# Patient Record
(demographics unavailable — no encounter records)

---

## 2025-02-10 NOTE — DISCUSSION/SUMMARY
[de-identified] : (Impression) -left ankle achilles tendonitis, left foot plantar fascitis  The diagnosis was explained in detail. The potential non-surgical and surgical treatments were reviewed. The relative risks and benefits of each option were considered relative to the patient age, activity level, medical history, symptom severity and previously attempted treatments.  The patient was advised to consult with their primary medical provider prior to initiation of any new medications to reduce the risk of adverse effects specific to their long-term home medications and medical history. The risk of gastrointestinal irritation and kidney injury specific to long-term NSAID use was discussed.    (Plan)  -Physical therapy recommended for stretching and strengthening. -Meloxicam for pain and inflammation, take as needed. -MRI is deferred at this time.  -Recommend rest from running until symptoms improve.  -Follow up in 4 weeks. If symptoms persist consider MRI.     (MDM) Problem Complexity -Moderate: acute, complicated injury   Risk -Moderate: prescription medication   Visit Type -New: Patient has not been seen by another provider in my practice within the past 2 years who specializes in orthopedic surgery.

## 2025-02-10 NOTE — HISTORY OF PRESENT ILLNESS
[de-identified] : Date of initial evaluation: 02/10/2025 Patient age: 57 year Body part causing symptoms: left ankle and foot Location of the pain: posterior  Pain score today: 7/10 Duration of symptoms: 1 week History of injury: reports pain after running a 5k  Activities that worsen the pain: all movements Radicular symptoms: none Medications attempted for this problem: Ibuprofen PT for this problem: none Injections for this problem: none Previous surgery on this body part: none Prior imaging: none Occupation:

## 2025-02-10 NOTE — IMAGING
[de-identified] : (Exam: Ankle and Foot)   Laterality is left   Patient is in no acute distress, alert and oriented Sensation is grossly intact to light touch in the foot and ankle Motor function is 5/5 in the foot and ankle Capillary refill is less than 2 seconds in the toes Lymphadenopathy is not present Peripheral edema is not present   Skin is intact Lateral ankle swelling is not present Medial swelling is not present Foot swelling is not present Antalgic gait is present   ATFL insertion tenderness is not present Lateral malleolus tenderness is not present Base of 5th metatarsal tenderness is not present Medial malleolus tenderness is not present Syndesmosis tenderness is not present Knee tenderness is not present Calf tenderness is not present Achilles tenderness is  present without gapping Plantar fascia tenderness is present Midfoot tenderness is not present Forefoot tenderness is not present   Ankle dorsiflexion is 20 above neutral Ankle plantarflexion is 50 below neutral   Ankle dorsiflexion strength is 5/5 Ankle plantarflexion strength is 5/5 Great toe dorsiflexion strength is 5/5 Great toe plantarflexion strength is 5/5   Anterior drawer at ankle is normal Brizuela test test is normal [Left] : left tibia/fibula [There are no fractures, subluxations or dislocations. No significant abnormalities are seen] : There are no fractures, subluxations or dislocations. No significant abnormalities are seen